# Patient Record
Sex: MALE | Race: WHITE
[De-identification: names, ages, dates, MRNs, and addresses within clinical notes are randomized per-mention and may not be internally consistent; named-entity substitution may affect disease eponyms.]

---

## 2019-02-13 ENCOUNTER — HOSPITAL ENCOUNTER (EMERGENCY)
Dept: HOSPITAL 39 - ER | Age: 64
Discharge: HOME | End: 2019-02-13
Payer: COMMERCIAL

## 2019-02-13 VITALS — OXYGEN SATURATION: 90 % | SYSTOLIC BLOOD PRESSURE: 179 MMHG | TEMPERATURE: 96.9 F | DIASTOLIC BLOOD PRESSURE: 91 MMHG

## 2019-02-13 DIAGNOSIS — N34.2: Primary | ICD-10-CM

## 2019-02-13 NOTE — ED.PDOC
History of Present Illness





- General


Chief Complaint:  Problem


Time Seen by Provider: 19 13:03


Source: patient


Exam Limitations: no limitations





- History of Present Illness


Initial Comments: 





DYSURIA FOR 3 DAYS. NO HEMATURIA. HAS HAD BEFORE. NO FREQUENCY, SOME URGENCY. 


Severity: moderate


Improving Factors: nothing


Worsening Factors: nothing


Allergies/Adverse Reactions: 


Allergies





NO KNOWN ALLERGY Allergy (Verified 19 13:33)


   








Home Medications: 


Ambulatory Orders





Ciprofloxacin [Cipro] 500 mg PO BID #20 ml 19 


Phenazopyridine HCl [Pyridium] 200 mg PO TID PRN #9 tab 19 











Review of Systems





- Review of Systems


Constitutional: Denies: chills, fever


EENTM: States: no symptoms reported


Respiratory: Denies: cough, short of breath


Cardiology: States: no symptoms reported


Gastrointestinal/Abdominal: Denies: abdominal pain, nausea, vomiting


Genitourinary: States: dysuria.  Denies: discharge, hematuria


Musculoskeletal: States: no symptoms reported


Skin: States: no symptoms reported


Neurological: States: no symptoms reported


Endocrine: States: no symptoms reported


Hematologic/Lymphatic: States: no symptoms reported





Past Medical History (General)





- Patient Medical History


Hx of COPD: Yes


Hx Other - free text: 





BPH





Family Medical History





- Family History


  ** Father


Living Status: 


Hx Cardiac Disease: Yes





Physical Exam





- Physical Exam


General Appearance: No apparent distress, Obese


Eye Exam: bilateral normal


Ears, Nose, Throat: hearing grossly normal, normal ENT inspection


Neck: full range of motion, supple


Respiratory: lungs clear, normal breath sounds


Cardiovascular/Chest: regular rate, rhythm, no murmur


Gastrointestinal/Abdominal: normal bowel sounds, non tender, soft


Back Exam: normal inspection, no CVA tenderness


Extremity: normal range of motion, non-tender


Neurologic: alert, normal mood/affect


Skin Exam: normal color, warm/dry


Lymphatic: no adenopathy





Departure





- Departure


Clinical Impression: 


 Urethritis





Time of Disposition: 14:18


Disposition: Discharge to Home or Self Care


Condition: Good


Departure Forms:  ED Discharge - Pt. Copy, Patient Portal Self Enrollment


Instructions:  Urethritis


Referrals: 


Sandro Rider III, MD [Active Staff] - 1-2 Weeks


Prescriptions: 


Ciprofloxacin [Cipro] 500 mg PO BID #20 ml


Phenazopyridine HCl [Pyridium] 200 mg PO TID PRN #9 tab


 PRN Reason: Pain


Home Medications: 


Ambulatory Orders





Ciprofloxacin [Cipro] 500 mg PO BID #20 ml 19 


Phenazopyridine HCl [Pyridium] 200 mg PO TID PRN #9 tab 19

## 2020-06-10 ENCOUNTER — HOSPITAL ENCOUNTER (OUTPATIENT)
Dept: HOSPITAL 39 - LAB.O | Age: 65
End: 2020-06-10
Attending: NURSE PRACTITIONER
Payer: MEDICARE

## 2020-06-10 DIAGNOSIS — N32.9: ICD-10-CM

## 2020-06-10 DIAGNOSIS — N40.1: ICD-10-CM

## 2020-06-10 DIAGNOSIS — I71.4: ICD-10-CM

## 2020-06-10 DIAGNOSIS — N32.3: ICD-10-CM

## 2020-06-10 DIAGNOSIS — K57.20: ICD-10-CM

## 2020-06-10 DIAGNOSIS — R10.32: Primary | ICD-10-CM

## 2020-06-10 NOTE — CT
EXAM DESCRIPTION: 

Abdomen/Pelvis w/wo Contrast



CLINICAL HISTORY: 

LEFT LOWER QUADRANT PAIN



COMPARISON: 

None.



TECHNIQUE: 

Pre and postcontrast CT images of the abdomen and pelvis are

obtained using standard imaging protocol. This exam was performed

according to our departmental dose-optimization program, which

includes automated exposure control, adjustment of the mA and/or

kV according to patient size and/or use of iterative

reconstruction technique .



FINDINGS: 

Visualized lung bases shows no acute findings. Severe coronary

artery calcifications. Heart is enlarged. Trace pericardial

effusion.



The liver, spleen, pancreas, adrenal glands, and gallbladder are

unremarkable.

Moderate to severe atherosclerotic disease with calcified and

noncalcified plaque is seen. Fusiform aneurysmal dilatation of

the infrarenal abdominal aorta measures maximum 4.9 cm AP with

mild circumferential mural thrombus.



Vascular calcifications in the kidneys are seen. No

nephrolithiasis. Normal cortical enhancement and excretion of

contrast. Less than 1 cm cortical cysts are seen in both kidneys

left greater than right. No filling defects are seen in the

pelvicalyceal systems or ureters. No ureteral calcification or

obstruction.



Urinary bladder is distended and fills with contrast on delayed

images. Large fluid attenuation mass posterior to the urinary

bladder asymmetric towards the right measures at least 8.0 x 5.0

cm. Communication with the urinary bladder is seen on sagittal

image 86 of series 602. A smaller cystic mass more inferiorly at

the base of the urinary bladder and prostate measures 3.5 x 3.1

cm filling with contrast on delayed images.



Prostate is enlarged measuring at least 5.8 x 6.2 x 5.5 cm

indenting into the floor the urinary bladder.



The appendix is air-filled and normal.

Stomach is poorly distended. No mass lesion or inflammation.

No small bowel obstruction or bowel wall thickening.



Moderate scattered diverticuli of the descending to sigmoid colon

are seen. Mild fat stranding associated with posterior

diverticulum in the lower descending colon at the level of the

iliac crests with mild fat stranding in the paracolic gutter. No

free intraperitoneal air or abnormal drainable fluid collection.



No pathologically enlarged abdominal or retroperitoneal

lymphadenopathy.



Osseous structures show no aggressive bony lesions. Moderate

spondylitic changes of the fine are seen.



IMPRESSION: 

Colon diverticulosis with CT evidence of uncomplicated acute

diverticulitis in the mid to inferior left colon.



Enlarged prostate. Recommend correlation with physical exam

findings and serum PSA as indicated to exclude prostate cancer.



Distended urinary bladder with 2 large bladder diverticuli are

seen likely secondary to some degree of chronic bladder outlet

obstruction.



4.9 cm abdominal aortic aneurysm. Recommend follow-up every 6

months and vascular consultation.

Reference: J Am Anne Radiol 2013;10:789-794.



Other findings as described above.



 



Electronically signed by:  Shamar Sanchez MD  6/10/2020 2:48 PM CDT

Workstation: [id number]